# Patient Record
Sex: FEMALE
[De-identification: names, ages, dates, MRNs, and addresses within clinical notes are randomized per-mention and may not be internally consistent; named-entity substitution may affect disease eponyms.]

---

## 2018-01-03 NOTE — DIAGNOSTIC IMAGING REPORT
PROCEDURE:X-RAY RIGHT LOWER LEG

 

COMPARISON:None.

 

INDICATIONS:RIGHT LOWER LEG WOUND

 

FINDINGS:

There are no fractures, dislocations, lytic or blastic lesions. Right 

total knee arthroplasty is good alignment with no evidence of loosening 

or infection. Diffuse bony demineralization. Soft tissue swelling in 

the upper one third of the lower leg, possibly related to stockings 

compressing the lower two thirds.

 

CONCLUSION:

1. No acute osseous abnormalities.

2. Stable right total knee arthroplasty.

3. Soft tissue swelling upper one third of the lower leg, possibly 

related to stockings compressing the lower two thirds.

 

Dictated by:  Guillermo Sotomayor M.D. on 1/03/2018 at 11:21     

Electronically approved by:  Guillermo Sotomayor M.D. on 1/03/2018 at 11:21

## 2018-01-31 ENCOUNTER — HOSPITAL ENCOUNTER (OUTPATIENT)
Dept: HOSPITAL 88 - WCC | Age: 83
End: 2018-01-31
Attending: FAMILY MEDICINE
Payer: MEDICARE

## 2018-01-31 DIAGNOSIS — I87.2: ICD-10-CM

## 2018-01-31 DIAGNOSIS — S81.801A: Primary | ICD-10-CM

## 2018-01-31 DIAGNOSIS — S80.11XA: ICD-10-CM
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
## 2018-01-31 LAB
ALBUMIN SERPL-MCNC: 3.5 G/DL (ref 3.5–5)
ALBUMIN/GLOB SERPL: 0.9 {RATIO} (ref 0.8–2)
ALP SERPL-CCNC: 103 IU/L (ref 40–150)
ALT SERPL-CCNC: 11 IU/L (ref 0–55)
ANION GAP SERPL CALC-SCNC: 12 MMOL/L (ref 8–16)
BASOPHILS # BLD AUTO: 0 10*3/UL (ref 0–0.1)
BASOPHILS NFR BLD AUTO: 0.4 % (ref 0–1)
BUN SERPL-MCNC: 17 MG/DL (ref 7–26)
BUN/CREAT SERPL: 27 (ref 6–25)
CALCIUM SERPL-MCNC: 9.1 MG/DL (ref 8.4–10.2)
CHLORIDE SERPL-SCNC: 109 MMOL/L (ref 98–107)
CO2 SERPL-SCNC: 26 MMOL/L (ref 22–29)
DEPRECATED NEUTROPHILS # BLD AUTO: 3.1 10*3/UL (ref 2.1–6.9)
EGFRCR SERPLBLD CKD-EPI 2021: > 60 ML/MIN (ref 60–?)
EOSINOPHIL # BLD AUTO: 0.1 10*3/UL (ref 0–0.4)
EOSINOPHIL NFR BLD AUTO: 1.7 % (ref 0–6)
ERYTHROCYTE [DISTWIDTH] IN CORD BLOOD: 12.9 % (ref 11.7–14.4)
GLOBULIN PLAS-MCNC: 3.8 G/DL (ref 2.3–3.5)
GLUCOSE SERPLBLD-MCNC: 93 MG/DL (ref 74–118)
HCT VFR BLD AUTO: 35.4 % (ref 34.2–44.1)
HGB BLD-MCNC: 11.6 G/DL (ref 12–16)
LYMPHOCYTES # BLD: 1.2 10*3/UL (ref 1–3.2)
LYMPHOCYTES NFR BLD AUTO: 24.8 % (ref 18–39.1)
MCH RBC QN AUTO: 30 PG (ref 28–32)
MCHC RBC AUTO-ENTMCNC: 32.8 G/DL (ref 31–35)
MCV RBC AUTO: 91.5 FL (ref 81–99)
MONOCYTES # BLD AUTO: 0.4 10*3/UL (ref 0.2–0.8)
MONOCYTES NFR BLD AUTO: 8 % (ref 4.4–11.3)
NEUTS SEG NFR BLD AUTO: 64.7 % (ref 38.7–80)
PLATELET # BLD AUTO: 260 X10E3/UL (ref 140–360)
POTASSIUM SERPL-SCNC: 4 MMOL/L (ref 3.5–5.1)
RBC # BLD AUTO: 3.87 X10E6/UL (ref 3.6–5.1)
SODIUM SERPL-SCNC: 143 MMOL/L (ref 136–145)

## 2018-01-31 PROCEDURE — 87075 CULTR BACTERIA EXCEPT BLOOD: CPT

## 2018-01-31 PROCEDURE — 87205 SMEAR GRAM STAIN: CPT

## 2018-01-31 PROCEDURE — 85025 COMPLETE CBC W/AUTO DIFF WBC: CPT

## 2018-01-31 PROCEDURE — 87186 SC STD MICRODIL/AGAR DIL: CPT

## 2018-01-31 PROCEDURE — 84134 ASSAY OF PREALBUMIN: CPT

## 2018-01-31 PROCEDURE — 87071 CULTURE AEROBIC QUANT OTHER: CPT

## 2018-01-31 PROCEDURE — 80053 COMPREHEN METABOLIC PANEL: CPT

## 2018-01-31 PROCEDURE — 36415 COLL VENOUS BLD VENIPUNCTURE: CPT

## 2018-02-28 ENCOUNTER — HOSPITAL ENCOUNTER (OUTPATIENT)
Dept: HOSPITAL 88 - WCC | Age: 83
End: 2018-02-28
Attending: FAMILY MEDICINE
Payer: MEDICARE
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
## 2018-02-28 DIAGNOSIS — I87.2: ICD-10-CM

## 2018-02-28 DIAGNOSIS — B96.29: ICD-10-CM

## 2018-02-28 DIAGNOSIS — S81.801A: Primary | ICD-10-CM

## 2018-02-28 DIAGNOSIS — R60.0: ICD-10-CM

## 2018-02-28 PROCEDURE — 99213 OFFICE O/P EST LOW 20 MIN: CPT

## 2018-02-28 PROCEDURE — 87071 CULTURE AEROBIC QUANT OTHER: CPT

## 2018-02-28 PROCEDURE — 87205 SMEAR GRAM STAIN: CPT

## 2018-02-28 PROCEDURE — 15271 SKIN SUB GRAFT TRNK/ARM/LEG: CPT

## 2018-02-28 PROCEDURE — 87075 CULTR BACTERIA EXCEPT BLOOD: CPT

## 2018-03-28 ENCOUNTER — HOSPITAL ENCOUNTER (OUTPATIENT)
Dept: HOSPITAL 88 - WCC | Age: 83
LOS: 3 days | End: 2018-03-31
Attending: FAMILY MEDICINE
Payer: MEDICARE

## 2018-03-28 DIAGNOSIS — R60.0: ICD-10-CM

## 2018-03-28 DIAGNOSIS — S81.801A: Primary | ICD-10-CM

## 2018-03-28 DIAGNOSIS — I87.2: ICD-10-CM
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
## 2018-03-28 PROCEDURE — 99213 OFFICE O/P EST LOW 20 MIN: CPT

## 2018-03-28 PROCEDURE — 15271 SKIN SUB GRAFT TRNK/ARM/LEG: CPT

## 2018-04-25 ENCOUNTER — HOSPITAL ENCOUNTER (OUTPATIENT)
Dept: HOSPITAL 88 - WCC | Age: 83
LOS: 5 days | End: 2018-04-30
Attending: FAMILY MEDICINE
Payer: MEDICARE

## 2018-04-25 DIAGNOSIS — I87.2: ICD-10-CM
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
## 2018-04-25 DIAGNOSIS — R60.0: ICD-10-CM

## 2018-04-25 DIAGNOSIS — S81.801A: Primary | ICD-10-CM

## 2018-04-25 PROCEDURE — 29581 APPL MULTLAYER CMPRN SYS LEG: CPT

## 2018-04-25 PROCEDURE — 99213 OFFICE O/P EST LOW 20 MIN: CPT

## 2018-04-25 PROCEDURE — 15271 SKIN SUB GRAFT TRNK/ARM/LEG: CPT

## 2018-05-30 ENCOUNTER — HOSPITAL ENCOUNTER (OUTPATIENT)
Dept: HOSPITAL 88 - WCC | Age: 83
LOS: 1 days | End: 2018-05-31
Attending: FAMILY MEDICINE
Payer: MEDICARE

## 2018-05-30 DIAGNOSIS — S81.801A: Primary | ICD-10-CM
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
## 2018-05-30 DIAGNOSIS — R60.0: ICD-10-CM

## 2018-05-30 DIAGNOSIS — I87.2: ICD-10-CM

## 2018-06-27 ENCOUNTER — HOSPITAL ENCOUNTER (OUTPATIENT)
Dept: HOSPITAL 88 - WCC | Age: 83
LOS: 3 days | End: 2018-06-30
Attending: FAMILY MEDICINE
Payer: MEDICARE
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
## 2018-06-27 DIAGNOSIS — S81.801A: Primary | ICD-10-CM

## 2018-06-27 DIAGNOSIS — R60.0: ICD-10-CM

## 2018-06-27 DIAGNOSIS — I87.2: ICD-10-CM

## 2018-06-27 PROCEDURE — 15271 SKIN SUB GRAFT TRNK/ARM/LEG: CPT

## 2018-06-27 PROCEDURE — 11042 DBRDMT SUBQ TIS 1ST 20SQCM/<: CPT

## 2018-07-25 ENCOUNTER — HOSPITAL ENCOUNTER (OUTPATIENT)
Dept: HOSPITAL 88 - WCC | Age: 83
LOS: 6 days | End: 2018-07-31
Attending: FAMILY MEDICINE
Payer: MEDICARE

## 2018-07-25 DIAGNOSIS — S81.801A: Primary | ICD-10-CM

## 2018-07-25 DIAGNOSIS — R60.0: ICD-10-CM
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
## 2018-07-25 DIAGNOSIS — I87.2: ICD-10-CM

## 2018-08-15 ENCOUNTER — HOSPITAL ENCOUNTER (OUTPATIENT)
Dept: HOSPITAL 88 - WCC | Age: 83
LOS: 16 days | End: 2018-08-31
Attending: FAMILY MEDICINE
Payer: MEDICARE
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
## 2018-08-15 DIAGNOSIS — S81.801A: Primary | ICD-10-CM

## 2018-08-15 DIAGNOSIS — I87.2: ICD-10-CM

## 2018-08-15 DIAGNOSIS — R60.0: ICD-10-CM
